# Patient Record
(demographics unavailable — no encounter records)

---

## 2024-10-16 NOTE — HISTORY OF PRESENT ILLNESS
[FreeTextEntry1] : ww: wellness, dysuria , hot flashes  [de-identified] : Patient presented for wellness. She denies CP,SOB,Abd pain, no N,V,C,D.  Cardialgy regularly is s/p hysterectomy- Endo Ca, now c/o hot flashes.  Pt c/o dysuria, 2 days.

## 2024-10-16 NOTE — HEALTH RISK ASSESSMENT
[Very Good] : ~his/her~  mood as very good [Yes] : Yes [2 - 4 times a month (2 pts)] : 2-4 times a month (2 points) [1 or 2 (0 pts)] : 1 or 2 (0 points) [Never (0 pts)] : Never (0 points) [No] : In the past 12 months have you used drugs other than those required for medical reasons? No [No falls in past year] : Patient reported no falls in the past year [0] : 2) Feeling down, depressed, or hopeless: Not at all (0) [PHQ-2 Negative - No further assessment needed] : PHQ-2 Negative - No further assessment needed [Never] : Never [Patient reported mammogram was normal] : Patient reported mammogram was normal [Patient reported PAP Smear was normal] : Patient reported PAP Smear was normal [Patient reported bone density results were abnormal] : Patient reported bone density results were abnormal [Patient reported colonoscopy was normal] : Patient reported colonoscopy was normal [None] : None [With Family] : lives with family [Retired] : retired [College] : College [] :  [# Of Children ___] : has [unfilled] children [Feels Safe at Home] : Feels safe at home [Independent] : managing finances [Smoke Detector] : smoke detector [Carbon Monoxide Detector] : carbon monoxide detector [Seat Belt] :  uses seat belt [Sunscreen] : uses sunscreen [With Patient/Caregiver] : , with patient/caregiver [Designated Healthcare Proxy] : Designated healthcare proxy [Name: ___] : Health Care Proxy's Name: [unfilled]  [Relationship: ___] : Relationship: [unfilled] [Aggressive treatment] : aggressive treatment [FreeTextEntry1] : none [de-identified] : No [de-identified] : GYN,Cardiol  [Audit-CScore] : 2 [de-identified] : walking  [de-identified] : regular  [[id number]] : 0 [NO] : No [Change in mental status noted] : No change in mental status noted [Language] : denies difficulty with language [Reports changes in hearing] : Reports no changes in hearing [Reports changes in vision] : Reports no changes in vision [Reports changes in dental health] : Reports no changes in dental health [MammogramDate] : 02/24 [PapSmearDate] : 02/22 [BoneDensityDate] : 11/22 [BoneDensityComments] : osteopenia  [ColonoscopyDate] : 04/19 [HIVDate] : 03/19 [HepatitisCDate] : 03/19 [AdvancecareDate] : 10/24

## 2024-10-16 NOTE — PHYSICAL EXAM
[No Acute Distress] : no acute distress [No Varicosities] : no varicosities [No Edema] : there was no peripheral edema [Normal Appearance] : normal in appearance [No Masses] : no palpable masses [No Nipple Discharge] : no nipple discharge [No Axillary Lymphadenopathy] : no axillary lymphadenopathy [No Rash] : no rash [Normal] : normal sclera/conjunctiva, pupils are equal, round and reactive to light and extraocular movements are intact [de-identified] : + keratosis

## 2024-10-16 NOTE — REVIEW OF SYSTEMS
[Fever] : no fever [Chills] : no chills [Fatigue] : no fatigue [Hot Flashes] : hot flashes [Night Sweats] : no night sweats [Recent Change In Weight] : ~T no recent weight change [Suicidal] : not suicidal [Insomnia] : insomnia [Anxiety] : no anxiety [Depression] : no depression [Negative] : Neurological

## 2025-02-12 NOTE — PHYSICAL EXAM
[No Acute Distress] : no acute distress [No Edema] : there was no peripheral edema [Alert and Oriented x3] : oriented to person, place, and time [Normal] : affect was normal and insight and judgment were intact [Normal Outer Ear/Nose] : the outer ears and nose were normal in appearance [No CVA Tenderness] : no CVA  tenderness [No Joint Swelling] : no joint swelling [de-identified] : left thumb tenderness

## 2025-02-12 NOTE — HEALTH RISK ASSESSMENT
[Yes] : Yes [2 - 4 times a month (2 pts)] : 2-4 times a month (2 points) [1 or 2 (0 pts)] : 1 or 2 (0 points) [Never (0 pts)] : Never (0 points) [No] : In the past 12 months have you used drugs other than those required for medical reasons? No [No falls in past year] : Patient reported no falls in the past year [0] : 2) Feeling down, depressed, or hopeless: Not at all (0) [PHQ-2 Negative - No further assessment needed] : PHQ-2 Negative - No further assessment needed [Never] : Never [Audit-CScore] : 0 [de-identified] : walking  [de-identified] : regular [[id number]] : 0

## 2025-02-12 NOTE — REVIEW OF SYSTEMS
[Insomnia] : insomnia [Fever] : no fever [Chills] : no chills [Fatigue] : no fatigue [Joint Pain] : joint pain [Suicidal] : not suicidal [Anxiety] : no anxiety [Depression] : no depression [Negative] : Respiratory [FreeTextEntry9] : left thumb pain

## 2025-02-12 NOTE — HISTORY OF PRESENT ILLNESS
[FreeTextEntry1] : cc: f/u Insomnia,  Anxiety ,weight. left thumb problem  [de-identified] : Patient presented  to f/u on her multiple leighann problems, She c/o left thump pain, clicking noise. She denies CP,SOB,Abd pain, no N,V,C,D. Last mammo NL

## 2025-02-20 NOTE — ADDENDUM
[FreeTextEntry1] : I, Marisa Agrawal, wrote this note acting as a scribe for Dr. Emile Darling M.D. on 02/20/2025.   All medical record entries made by the Scribe were at my, Dr. Emile Darling M.D., direction and personally dictated by me on 02/20/2025. I have personally reviewed the chart and agree that the record accurately reflects my personal performance of the history, physical exam, assessment, and plan.

## 2025-02-20 NOTE — ASSESSMENT
[FreeTextEntry1] : The underlying pathophysiology was reviewed with the patient. XR films were reviewed with the patient. Discussed at length the nature of the patient's condition. The left hand symptoms appear secondary to a left thumb trigger digit. The patient and I discussed her options regarding treatment.  The patient wishes to proceed with a cortisone injection at this time. The skin was prepped with alcohol and sprayed with Ethyl Chloride. An injection of 0.5 cc 1% Lidocaine without epinephrine, 0.25 cc Kenalog 40mg, and 0.25 cc Dexamethasone was administered into the flexor tendon sheath of the left thumb. The patient tolerated the procedure well. Apply ice.  Patient was advised to take OTC medications and topical analgesic for pain management. She was encouraged to use topical ointments such as Voltaren Gel and Icy Hot.  All questions answered, understanding verbalized. Patient in agreement with plan of care. She may follow up as needed. If the patient begins to experience any changes or severe exacerbation of her symptoms, she should reach out to me as soon as possible.

## 2025-02-20 NOTE — PHYSICAL EXAM
[de-identified] : Patient is WDWN, alert, and in no acute distress. Breathing is unlabored. She is grossly oriented to person, place, and time.  Left Hand: There is A1 pulley tenderness in the left thumb. Full arc of motion in the finger, and all intrinsic and extrinsic hand muscles 5/5. No joint instability on provocative testing, sensation intact to light touch throughout.

## 2025-02-24 NOTE — HISTORY OF PRESENT ILLNESS
[FreeTextEntry1] : IA gr1 endometrial cancer Robotic TLH/BSO/SLN 6/13/24  MMR intact  She reports occasional breast discharge adjacent to nipple. Recent breast mammogram/sonogram normal.  Stable urinary urgency.  Denies abdominal/pelvic pain, dyspnea or chest pain, vaginal bleeding or discharge, nausea/vomiting, changes in bowel habits, lower extremity edema or pain.

## 2025-03-03 NOTE — PHYSICAL EXAM
[No Acute Distress] : no acute distress [Normal Sclera/Conjunctiva] : normal sclera/conjunctiva [Normal Outer Ear/Nose] : the outer ears and nose were normal in appearance [Normal Rate] : normal rate  [Regular Rhythm] : with a regular rhythm [No Edema] : there was no peripheral edema [Normal] : no joint swelling and grossly normal strength and tone [de-identified] : + pharynx erythema

## 2025-03-03 NOTE — REVIEW OF SYSTEMS
[Fever] : fever [Chills] : chills [Fatigue] : fatigue [Earache] : earache [Hoarseness] : hoarseness [Nasal Discharge] : nasal discharge [Sore Throat] : sore throat [Postnasal Drip] : postnasal drip [Shortness Of Breath] : no shortness of breath [Cough] : cough [Dyspnea on Exertion] : no dyspnea on exertion [Negative] : Integumentary

## 2025-03-03 NOTE — PHYSICAL EXAM
[No Acute Distress] : no acute distress [Normal Sclera/Conjunctiva] : normal sclera/conjunctiva [Normal Outer Ear/Nose] : the outer ears and nose were normal in appearance [Normal Rate] : normal rate  [Regular Rhythm] : with a regular rhythm [No Edema] : there was no peripheral edema [Normal] : no joint swelling and grossly normal strength and tone [de-identified] : + pharynx erythema

## 2025-03-03 NOTE — HISTORY OF PRESENT ILLNESS
[FreeTextEntry8] : pt is not feeling well for 2 days, fever, chills ,cough, no SOB,no CP , took OTM meds, not better.

## 2025-03-03 NOTE — HEALTH RISK ASSESSMENT
[Yes] : Yes [Monthly or less (1 pt)] : Monthly or less (1 point) [1 or 2 (0 pts)] : 1 or 2 (0 points) [Never (0 pts)] : Never (0 points) [No] : In the past 12 months have you used drugs other than those required for medical reasons? No [No falls in past year] : Patient reported no falls in the past year [Audit-CScore] : 1 [de-identified] : not now tired  [de-identified] : regular  [Never] : Never

## 2025-03-03 NOTE — HEALTH RISK ASSESSMENT
[Yes] : Yes [Monthly or less (1 pt)] : Monthly or less (1 point) [1 or 2 (0 pts)] : 1 or 2 (0 points) [Never (0 pts)] : Never (0 points) [No] : In the past 12 months have you used drugs other than those required for medical reasons? No [No falls in past year] : Patient reported no falls in the past year [Audit-CScore] : 1 [de-identified] : not now tired  [de-identified] : regular  [Never] : Never

## 2025-03-28 NOTE — PHYSICAL EXAM
[No Acute Distress] : no acute distress [Normal Sclera/Conjunctiva] : normal sclera/conjunctiva [Normal Outer Ear/Nose] : the outer ears and nose were normal in appearance [de-identified] : unable du to TEB

## 2025-03-28 NOTE — HISTORY OF PRESENT ILLNESS
[Home] : at home, [unfilled] , at the time of the visit. [Medical Office: ([facility])___] : at the medical office located in  [Telehealth (audio & video)] : This visit was provided via telehealth using real-time 2-way audio visual technology. [Verbal consent obtained from patient] : the patient, [unfilled] [FreeTextEntry8] : cc: urinary p[daylin Pt c/o urgency ,frequency, no fever no chills no abd or back pain, SHe did  homeUS edgardo + LE /+ N.

## 2025-03-28 NOTE — PHYSICAL EXAM
[No Acute Distress] : no acute distress [Normal Sclera/Conjunctiva] : normal sclera/conjunctiva [Normal Outer Ear/Nose] : the outer ears and nose were normal in appearance [de-identified] : unable du to TEB

## 2025-03-28 NOTE — HISTORY OF PRESENT ILLNESS
[Home] : at home, [unfilled] , at the time of the visit. [Medical Office: ([facility])___] : at the medical office located in  [Telehealth (audio & video)] : This visit was provided via telehealth using real-time 2-way audio visual technology. [Verbal consent obtained from patient] : the patient, [unfilled] [FreeTextEntry8] : cc: urinary p[daylin Pt c/o urgency ,frequency, no fever no chills no abd or back pain, SHe did  homeUS edgardo + LE /+ N.

## 2025-03-28 NOTE — END OF VISIT
[Time Spent: ___ minutes] : I have spent [unfilled] minutes of time on the encounter which excludes teaching and separately reported services.
[Time Spent: ___ minutes] : I have spent [unfilled] minutes of time on the encounter which excludes teaching and separately reported services.
Abdominal Pain, N/V/D

## 2025-03-28 NOTE — REVIEW OF SYSTEMS
[Dysuria] : dysuria [Incontinence] : incontinence [Hematuria] : no hematuria [Frequency] : frequency [Negative] : Neurological

## 2025-05-15 NOTE — HISTORY OF PRESENT ILLNESS
[FreeTextEntry1] : IA gr1 endometrial cancer Robotic TLH/BSO/SLN 6/13/24  MMR intact  She reports vaginal irritation at the introitus with burning, Stable urinary urgency.  Denies abdominal/pelvic pain, dyspnea or chest pain, vaginal bleeding or discharge, nausea/vomiting, changes in bowel habits, lower extremity edema or pain.

## 2025-05-15 NOTE — PHYSICAL EXAM
[Chaperoned Physical Exam] : A chaperone was present in the examining room during all aspects of the physical examination. [MA] : MA [FreeTextEntry2] : Amina [Absent] : Adnexa(ae): Absent [Normal] : Recto-Vaginal Exam: Normal [de-identified] : vulva with dryness no lesions noted

## 2025-05-15 NOTE — DISCUSSION/SUMMARY
[FreeTextEntry1] : - vulva irritation/ burning a/w dryness discussed vaginal moisturizers and lubrications to use - Ua/culture ordered RTO as scheduled with Dr. Jane

## 2025-05-15 NOTE — PHYSICAL EXAM
[Chaperoned Physical Exam] : A chaperone was present in the examining room during all aspects of the physical examination. [MA] : MA [FreeTextEntry2] : Amina [Absent] : Adnexa(ae): Absent [Normal] : Recto-Vaginal Exam: Normal [de-identified] : vulva with dryness no lesions noted

## 2025-06-06 NOTE — ASSESSMENT
[FreeTextEntry1] : -  Patient tolerated silver nitrate cauterization. -  Saline spray to right nostril -  Hold off antihistamines for now. -  Follow up as needed.

## 2025-06-06 NOTE — PROCEDURE
[Epistaxis] : evaluation of epistaxis [Topical Lidocaine] : topical lidocaine [Oxymetazoline HCl] : oxymetazoline HCl [Flexible Endoscope] : examined with the flexible endoscope [Serial Number: ___] : Serial Number: [unfilled] [Location: ___] : perforation location: [unfilled] [Cauterized w/Silver Nitrate] : the bleeding was cauterized with silver nitrate [Normal] : the inferior, middle and superior meatus are normal

## 2025-06-06 NOTE — HISTORY OF PRESENT ILLNESS
[de-identified] : Ms. LARA is a 75 year-old female who presents with epistaxis.  She reports that she developed a nosebleed from her left nostril yesterday as she was reaching down.  She noted that it did not last long and eventually subsided.  Today, about an hour ago, her right nostril started bleeding again.  She has used Afrin and pressure of which it took about 30 minutes to stop.  She denies pain or discomfort.

## 2025-06-06 NOTE — CONSULT LETTER
[Dear  ___] : Dear  [unfilled], [Courtesy Letter:] : I had the pleasure of seeing your patient, [unfilled], in my office today. [Please see my note below.] : Please see my note below. [Sincerely,] : Sincerely, [FreeTextEntry2] : Rhina Renee MD ([facility]) [FreeTextEntry3] : Luis Jaimes, LINNETTE, PARemiC Sr. Physician Assistant, Otolaryngology at [facility] Adjunct Clinical Instructor, Department of Physician Assistant Studies, [facility] [address] [address]

## 2025-06-06 NOTE — PHYSICAL EXAM
[Nasal Endoscopy Performed] : nasal endoscopy was performed, see procedure section for findings [] : septum deviated bilaterally [de-identified] : +dried and open wound seen in her right anterior septum. [Midline] : trachea located in midline position [Normal] : no rashes

## 2025-06-09 NOTE — COUNSELING
[Fall prevention counseling provided] : Fall prevention counseling provided [Adequate lighting] : Adequate lighting [No throw rugs] : No throw rugs [Use proper foot wear] : Use proper foot wear [Use recommended devices] : Use recommended devices [Potential consequences of obesity discussed] : Potential consequences of obesity discussed [Benefits of weight loss discussed] : Benefits of weight loss discussed [Encouraged to maintain food diary] : Encouraged to maintain food diary [Encouraged to increase physical activity] : Encouraged to increase physical activity [Encouraged to use exercise tracking device] : Encouraged to use exercise tracking device [Weigh Self Weekly] : weigh self weekly [Decrease Portions] : decrease portions [____ min/wk Activity] : [unfilled] min/wk activity [Keep Food Diary] : keep food diary [Good understanding] : Patient has a good understanding of lifestyle changes and steps needed to achieve self management goal [FreeTextEntry2] : DIET - importance of diet and weight loss discussed , particularly in light of patients current status and personal co -morbid conditions, Discussed principles of portion control and limitation of carb calories, as well as importance of regular exercise aga appropriate.  15 mins devoted to direct patient counseling, with time provided to address patient questions and concerns.   [FreeTextEntry4] : 15

## 2025-06-09 NOTE — PHYSICAL EXAM
[No Acute Distress] : no acute distress [Normal Outer Ear/Nose] : the outer ears and nose were normal in appearance [No Edema] : there was no peripheral edema [No CVA Tenderness] : no CVA  tenderness [No Joint Swelling] : no joint swelling [Alert and Oriented x3] : oriented to person, place, and time [Normal] : affect was normal and insight and judgment were intact

## 2025-06-09 NOTE — HEALTH RISK ASSESSMENT
[Yes] : Yes [2 - 4 times a month (2 pts)] : 2-4 times a month (2 points) [1 or 2 (0 pts)] : 1 or 2 (0 points) [Never (0 pts)] : Never (0 points) [No] : In the past 12 months have you used drugs other than those required for medical reasons? No [No falls in past year] : Patient reported no falls in the past year [0] : 2) Feeling down, depressed, or hopeless: Not at all (0) [PHQ-2 Negative - No further assessment needed] : PHQ-2 Negative - No further assessment needed [Never] : Never [Audit-CScore] : 0 [de-identified] : walking  [de-identified] : regular [[id number]] : 0

## 2025-06-09 NOTE — HISTORY OF PRESENT ILLNESS
[FreeTextEntry1] : cc: f/u Insomnia,  Anxiety, weight, tick bite [de-identified] : Patient presented  to f/u on her Insomnia,  Anxiety, weight, She was bitten by the  tick, seen by the Dermatol, blood work was RXed by the  Dermatol. Pt deneis rash, erythema, She denies CP,SOB,Abd pain, no N,V,C,D. Pt f/u with ENT .

## 2025-06-09 NOTE — REVIEW OF SYSTEMS
[Joint Pain] : joint pain [Insomnia] : insomnia [Negative] : Neurological [Vision Problems] : vision problems [Fever] : no fever [Chills] : no chills [Fatigue] : no fatigue [Suicidal] : not suicidal [Anxiety] : no anxiety [Depression] : no depression [FreeTextEntry3] : + cataract

## 2025-07-24 NOTE — PLAN
[FreeTextEntry1] : Likely uncomplicated UTI Will treat with Macrobid Will refer to urologist for recurrent UTIs Will send UA and UC Increase hydration

## 2025-07-24 NOTE — HISTORY OF PRESENT ILLNESS
[FreeTextEntry8] : Dysuria, urgency, frequency since last night. Has been having reoccurring infections since hysterectomy.  Reports possibly being dehydrated over the weekend